# Patient Record
Sex: MALE | Race: WHITE
[De-identification: names, ages, dates, MRNs, and addresses within clinical notes are randomized per-mention and may not be internally consistent; named-entity substitution may affect disease eponyms.]

---

## 2019-09-03 ENCOUNTER — HOSPITAL ENCOUNTER (OUTPATIENT)
Dept: HOSPITAL 92 - SDC | Age: 54
LOS: 1 days | Discharge: HOME | End: 2019-09-04
Attending: NEUROLOGICAL SURGERY
Payer: COMMERCIAL

## 2019-09-03 VITALS — BODY MASS INDEX: 30.1 KG/M2

## 2019-09-03 DIAGNOSIS — M48.061: Primary | ICD-10-CM

## 2019-09-03 DIAGNOSIS — M51.16: ICD-10-CM

## 2019-09-03 DIAGNOSIS — Z88.0: ICD-10-CM

## 2019-09-03 LAB
ANION GAP SERPL CALC-SCNC: 12 MMOL/L (ref 10–20)
APTT PPP: 28.8 SEC (ref 22.9–36.1)
BUN SERPL-MCNC: 14 MG/DL (ref 8.4–25.7)
CALCIUM SERPL-MCNC: 10.1 MG/DL (ref 7.8–10.44)
CHLORIDE SERPL-SCNC: 107 MMOL/L (ref 98–107)
CO2 SERPL-SCNC: 28 MMOL/L (ref 22–29)
CREAT CL PREDICTED SERPL C-G-VRATE: 129 ML/MIN (ref 70–130)
GLUCOSE SERPL-MCNC: 105 MG/DL (ref 70–105)
HGB BLD-MCNC: 16.4 G/DL (ref 14–18)
INR PPP: 1.1
MCH RBC QN AUTO: 32.1 PG (ref 27–31)
MCV RBC AUTO: 98.4 FL (ref 78–98)
PLATELET # BLD AUTO: 193 THOU/UL (ref 130–400)
POTASSIUM SERPL-SCNC: 5 MMOL/L (ref 3.5–5.1)
PROTHROMBIN TIME: 14.3 SEC (ref 12–14.7)
RBC # BLD AUTO: 5.11 MILL/UL (ref 4.7–6.1)
SODIUM SERPL-SCNC: 142 MMOL/L (ref 136–145)
WBC # BLD AUTO: 6 THOU/UL (ref 4.8–10.8)

## 2019-09-03 PROCEDURE — 85610 PROTHROMBIN TIME: CPT

## 2019-09-03 PROCEDURE — 85730 THROMBOPLASTIN TIME PARTIAL: CPT

## 2019-09-03 PROCEDURE — 76000 FLUOROSCOPY <1 HR PHYS/QHP: CPT

## 2019-09-03 PROCEDURE — 80048 BASIC METABOLIC PNL TOTAL CA: CPT

## 2019-09-03 PROCEDURE — 93005 ELECTROCARDIOGRAM TRACING: CPT

## 2019-09-03 PROCEDURE — 36415 COLL VENOUS BLD VENIPUNCTURE: CPT

## 2019-09-03 PROCEDURE — 93010 ELECTROCARDIOGRAM REPORT: CPT

## 2019-09-03 PROCEDURE — 85027 COMPLETE CBC AUTOMATED: CPT

## 2019-09-03 RX ADMIN — CLINDAMYCIN PHOSPHATE SCH MLS: 900 INJECTION, SOLUTION INTRAVENOUS at 18:23

## 2019-09-03 RX ADMIN — HYDROCODONE BITARTRATE AND ACETAMINOPHEN PRN TAB: 7.5; 325 TABLET ORAL at 23:18

## 2019-09-04 VITALS — TEMPERATURE: 98.2 F | SYSTOLIC BLOOD PRESSURE: 104 MMHG | DIASTOLIC BLOOD PRESSURE: 65 MMHG

## 2019-09-04 PROCEDURE — 01NB0ZZ RELEASE LUMBAR NERVE, OPEN APPROACH: ICD-10-PCS | Performed by: NEUROLOGICAL SURGERY

## 2019-09-04 PROCEDURE — 0ST20ZZ RESECTION OF LUMBAR VERTEBRAL DISC, OPEN APPROACH: ICD-10-PCS | Performed by: NEUROLOGICAL SURGERY

## 2019-09-04 RX ADMIN — HYDROCODONE BITARTRATE AND ACETAMINOPHEN PRN TAB: 7.5; 325 TABLET ORAL at 09:29

## 2019-09-04 RX ADMIN — CLINDAMYCIN PHOSPHATE SCH MLS: 900 INJECTION, SOLUTION INTRAVENOUS at 01:47

## 2019-09-04 NOTE — OP
DATE OF PROCEDURE:  09/03/2019



LOCATION:  OR 12.



ASSISTANT:  Beau Ngo PA-C



PREPROCEDURE DIAGNOSES:  Low back and left greater than right lower extremity pain

with lumbar stenosis and left L3-L4 lateral and far-lateral disk extrusion. 



POSTOPERATIVE DIAGNOSES:  Low back and left greater than right lower extremity pain

with lumbar stenosis and left L3-L4 lateral and far-lateral disk extrusion. 



PROCEDURES PERFORMED:  

1. L3-L4 and L4-L5 laminectomies, partial facetectomies, and foraminotomies.

2. Left L3-L4 transfacet approach for far lateral diskectomy.



DESCRIPTION OF PROCEDURE:  After informed consent was obtained from the patient, the

patient was brought to the OR.  Proper patient, pause, and identification were

carried out.  He was placed under excellent general endotracheal anesthesia and

positioned prone on the OR table.  All appropriate points were padded.  We

identified the L3, L4, and L5 dorsal spines and lamina.  A linear carolyn was made over

this region.  This area was sterilely cleansed, prepared, and draped.  Proper

patient, pause, and indication were carried out.  The wound was then opened in a

combination of sharp, monopolar, and blunt dissection.  The L3, L4, and L5 dorsal

spines and lamina exposed.  L3-L4 laminectomy was performed following localization,

excellent decompression of common dural tube, L3, L4, L5 nerve roots.  However, we

knew we had to do a left L3-L4 transfacet approach for the lateral and far lateral

diskectomy, so the microscope was brought in.  Left L3-L4 transfacet, lateral, and

far lateral diskectomy was performed on removal of multiple disk fragments in the

lateral and far-lateral components.  The wound was copiously irrigated.  Hemostasis

was maximized throughout the wound was then closed in anatomic layers following

sprinkling of vancomycin powder.  The patient has emerged from anesthesia. 







Job ID:  [id number]

## 2019-09-04 NOTE — PRG
DATE OF SERVICE:  09/04/2019



SUBJECTIVE:  Mr. Croft is postoperative day 1 from lumbar decompression and far

lateral diskectomy.  He is doing well with resolution of leg pain with excellent

strength in his lower extremities and very pleased with his outcome.  He will be

dismissed. 







Job ID:  [id number]

## 2019-09-05 NOTE — EKG
Test Reason : PREOP

Blood Pressure : ***/*** mmHG

Vent. Rate : 063 BPM     Atrial Rate : 063 BPM

   P-R Int : 168 ms          QRS Dur : 084 ms

    QT Int : 414 ms       P-R-T Axes : 020 -18 -08 degrees

   QTc Int : 423 ms

 

Normal sinus rhythm

Minimal voltage criteria for LVH, may be normal variant

Borderline ECG

 

Confirmed by EVA RODRIGUEZ ([id number]) on 9/5/2019 4:45:38 PM

 

Referred By:  CHER           Confirmed By:EVA RODRIGUEZ